# Patient Record
Sex: FEMALE | Race: BLACK OR AFRICAN AMERICAN | NOT HISPANIC OR LATINO | Employment: STUDENT | URBAN - METROPOLITAN AREA
[De-identification: names, ages, dates, MRNs, and addresses within clinical notes are randomized per-mention and may not be internally consistent; named-entity substitution may affect disease eponyms.]

---
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
## 2024-02-21 ENCOUNTER — TELEPHONE (OUTPATIENT)
Dept: PEDIATRIC ENDOCRINOLOGY | Facility: CLINIC | Age: 19
End: 2024-02-21
Payer: MEDICAID

## 2024-02-21 NOTE — TELEPHONE ENCOUNTER
----- Message from Carley Ramos MA sent at 2/19/2024 11:02 AM CST -----    ----- Message -----  From: Annette Payne  Sent: 2/19/2024  10:50 AM CST  To: [facility]ett Staff    Type:  Sooner Apoointment Request    Caller is requesting a sooner appointment.  Caller declined first available appointment listed below.  Caller will not accept being placed on the waitlist and is requesting a message be sent to doctor.  Name of Caller:pt mom   When is the first available appointment?  Symptoms:f/u   Would the patient rather a call back or a response via [facility]? Call   Best Call Back Number:[phone]  Additional Information: would prefer morning break is march 25-29th pt will be available

## 2024-02-21 NOTE — TELEPHONE ENCOUNTER
Called mom and scheduled appt on 06/19/2024 at 10:00 am. Mom verbalized understanding of appt date and time.

## 2024-07-02 ENCOUNTER — TELEPHONE (OUTPATIENT)
Dept: PEDIATRIC ENDOCRINOLOGY | Facility: CLINIC | Age: 19
End: 2024-07-02
Payer: MEDICAID

## 2024-07-02 NOTE — TELEPHONE ENCOUNTER
Margarita Garcia Staff  Caller: Unspecified (Today,  8:29 AM)  Appointment Access Request:    Pt's Mother Ruthann called to schedule a diabetes follow up appt for the pt and would like a call back this morning    Mom can be reached at [phone]      Patient is a 18 year old who has not been seen in 3 years. She is now a new patient. Routing message to Dr. Ware.

## 2024-07-03 NOTE — TELEPHONE ENCOUNTER
Spoke to Dr. Ware who recommends patient be seen with adult endocrinology as she is now 18 years old.     Spoke to the patient's mom and informed her that she would be considered a new patient since she has not been seen in 3 years. Additionally, because the patient is 18 year old she would need to be seen with adult endo. Advised the patient's mom to contact her PCP and request adult endo referral or further work up to see if adult endo referral is needed.     The patient's mom verbalized understanding and denied further questions or concerns.